# Patient Record
(demographics unavailable — no encounter records)

---

## 2025-04-25 NOTE — HISTORY OF PRESENT ILLNESS
[Patient reported PAP Smear was normal] : Patient reported PAP Smear was normal [Y] : Positive pregnancy history [No] : Patient does not have concerns regarding sex [Currently Active] : currently active [Men] : men [Mammogramdate] : 9/24/24 [TextBox_19] : BIRADS-2 [BreastSonogramDate] : 9/24/24 [TextBox_25] : BIRADS-2 [PapSmeardate] : 4/22/24 [LMPDate] : 03/31/25 [MensesFreq] : 28 [MensesLength] : 6-7 [PGxTotal] : 1 [[facility] [FreeTextEntry1] : 03/31/25 [FreeTextEntry3] : oral contraception (dannielle)

## 2025-04-25 NOTE — PHYSICAL EXAM
[Soft] : soft [Non-tender] : non-tender [Non-distended] : non-distended [No HSM] : No HSM [No Lesions] : no lesions [No Mass] : no mass [FreeTextEntry7] : 20 cm fibroid uterus  [Examination Of The Breasts] : a normal appearance [Breast Palpation Diffuse Fibrous Tissue Bilateral] : fibrocystic changes [No Masses] : no breast masses were palpable [Labia Majora] : normal [Labia Minora] : normal [Normal] : normal [Tenderness] : nontender [Enlarged ___ wks] : enlarged [unfilled] ~Uweeks [Anteversion] : anteverted [Uterine Adnexae] : non-palpable